# Patient Record
Sex: MALE | Race: BLACK OR AFRICAN AMERICAN | Employment: PART TIME | URBAN - METROPOLITAN AREA
[De-identification: names, ages, dates, MRNs, and addresses within clinical notes are randomized per-mention and may not be internally consistent; named-entity substitution may affect disease eponyms.]

---

## 2017-01-17 RX ORDER — LISINOPRIL 20 MG/1
TABLET ORAL
Qty: 90 TAB | Refills: 1 | Status: SHIPPED | OUTPATIENT
Start: 2017-01-17 | End: 2017-08-23 | Stop reason: SDUPTHER

## 2017-01-17 RX ORDER — WARFARIN SODIUM 5 MG/1
TABLET ORAL
Qty: 30 TAB | Refills: 5 | Status: SHIPPED | OUTPATIENT
Start: 2017-01-17 | End: 2017-07-26 | Stop reason: SDUPTHER

## 2017-05-12 RX ORDER — WARFARIN 1 MG/1
TABLET ORAL
Qty: 60 TAB | Refills: 0 | Status: SHIPPED | OUTPATIENT
Start: 2017-05-12 | End: 2017-07-26 | Stop reason: SDUPTHER

## 2017-05-24 RX ORDER — HYDROCHLOROTHIAZIDE 12.5 MG/1
TABLET ORAL
Qty: 90 TAB | Refills: 1 | Status: SHIPPED | OUTPATIENT
Start: 2017-05-24 | End: 2017-09-22 | Stop reason: SDUPTHER

## 2017-07-26 RX ORDER — WARFARIN 1 MG/1
TABLET ORAL
Qty: 60 TAB | Refills: 5 | Status: SHIPPED | OUTPATIENT
Start: 2017-07-26 | End: 2018-03-21 | Stop reason: SDUPTHER

## 2017-07-26 RX ORDER — WARFARIN SODIUM 5 MG/1
TABLET ORAL
Qty: 30 TAB | Refills: 5 | Status: SHIPPED | OUTPATIENT
Start: 2017-07-26 | End: 2018-03-21 | Stop reason: SDUPTHER

## 2017-08-15 RX ORDER — SILDENAFIL CITRATE 100 MG/1
TABLET, FILM COATED ORAL
Qty: 30 TAB | Refills: 5 | Status: SHIPPED | OUTPATIENT
Start: 2017-08-15 | End: 2017-10-19 | Stop reason: SDUPTHER

## 2017-08-23 RX ORDER — LISINOPRIL 20 MG/1
TABLET ORAL
Qty: 90 TAB | Refills: 1 | Status: SHIPPED | OUTPATIENT
Start: 2017-08-23 | End: 2018-03-21 | Stop reason: SDUPTHER

## 2017-09-22 RX ORDER — HYDROCHLOROTHIAZIDE 12.5 MG/1
TABLET ORAL
Qty: 90 TAB | Refills: 1 | Status: SHIPPED | OUTPATIENT
Start: 2017-09-22 | End: 2018-03-21 | Stop reason: SDUPTHER

## 2017-10-19 NOTE — TELEPHONE ENCOUNTER
Please see rx for patient to fax to [address] [place] [address] for [address]. Patient's secondary insurance wont cover this medication. Requested Prescriptions     Pending Prescriptions Disp Refills    sildenafil citrate (VIAGRA) 100 mg tablet 30 Tab 5     Sig: TAKE 1 TABLET BY MOUTH 30 MINUTES PRIOR TO SEXUAL ACTIVITY AS NEEDED.  (DO NOT EXCEED 100MG DAILY)

## 2017-10-24 RX ORDER — SILDENAFIL 100 MG/1
TABLET, FILM COATED ORAL
Qty: 30 TAB | Refills: 5 | Status: SHIPPED | OUTPATIENT
Start: 2017-10-24

## 2018-03-21 RX ORDER — WARFARIN SODIUM 5 MG/1
TABLET ORAL
Qty: 30 TAB | Refills: 5 | Status: SHIPPED | OUTPATIENT
Start: 2018-03-21 | End: 2018-08-28 | Stop reason: SDUPTHER

## 2018-03-21 RX ORDER — WARFARIN 1 MG/1
TABLET ORAL
Qty: 60 TAB | Refills: 5 | Status: SHIPPED | OUTPATIENT
Start: 2018-03-21 | End: 2018-08-28 | Stop reason: SDUPTHER

## 2018-03-21 RX ORDER — HYDROCHLOROTHIAZIDE 12.5 MG/1
TABLET ORAL
Qty: 90 TAB | Refills: 1 | Status: SHIPPED | OUTPATIENT
Start: 2018-03-21 | End: 2018-06-25 | Stop reason: SDUPTHER

## 2018-03-21 RX ORDER — LISINOPRIL 20 MG/1
TABLET ORAL
Qty: 90 TAB | Refills: 1 | Status: SHIPPED | OUTPATIENT
Start: 2018-03-21 | End: 2018-06-25 | Stop reason: SDUPTHER

## 2018-04-12 ENCOUNTER — TELEPHONE (OUTPATIENT)
Dept: FAMILY MEDICINE CLINIC | Age: 63
End: 2018-04-12

## 2018-06-27 RX ORDER — LISINOPRIL 20 MG/1
TABLET ORAL
Qty: 90 TAB | Refills: 1 | Status: SHIPPED | OUTPATIENT
Start: 2018-06-27 | End: 2019-06-07

## 2018-06-27 RX ORDER — HYDROCHLOROTHIAZIDE 12.5 MG/1
TABLET ORAL
Qty: 90 TAB | Refills: 1 | Status: SHIPPED | OUTPATIENT
Start: 2018-06-27

## 2018-08-24 RX ORDER — SILDENAFIL 100 MG/1
TABLET, FILM COATED ORAL
Qty: 30 TAB | Refills: 5 | Status: SHIPPED | OUTPATIENT
Start: 2018-08-24

## 2018-08-28 RX ORDER — WARFARIN SODIUM 5 MG/1
TABLET ORAL
Qty: 30 TAB | Refills: 5 | Status: SHIPPED | OUTPATIENT
Start: 2018-08-28 | End: 2019-05-24 | Stop reason: SDUPTHER

## 2018-08-28 RX ORDER — WARFARIN 1 MG/1
TABLET ORAL
Qty: 60 TAB | Refills: 5 | Status: SHIPPED | OUTPATIENT
Start: 2018-08-28 | End: 2019-05-24 | Stop reason: SDUPTHER

## 2018-09-15 RX ORDER — SILDENAFIL 100 MG/1
TABLET, FILM COATED ORAL
Qty: 30 TAB | Refills: 5 | Status: SHIPPED | OUTPATIENT
Start: 2018-09-15

## 2019-05-24 ENCOUNTER — OFFICE VISIT (OUTPATIENT)
Dept: FAMILY MEDICINE CLINIC | Age: 64
End: 2019-05-24

## 2019-05-24 VITALS
RESPIRATION RATE: 16 BRPM | SYSTOLIC BLOOD PRESSURE: 144 MMHG | HEART RATE: 51 BPM | WEIGHT: 225 LBS | OXYGEN SATURATION: 98 % | DIASTOLIC BLOOD PRESSURE: 88 MMHG | HEIGHT: 72 IN | TEMPERATURE: 97.4 F | BODY MASS INDEX: 30.48 KG/M2

## 2019-05-24 DIAGNOSIS — I26.99 OTHER PULMONARY EMBOLISM WITHOUT ACUTE COR PULMONALE, UNSPECIFIED CHRONICITY (HCC): ICD-10-CM

## 2019-05-24 DIAGNOSIS — Z79.01 ANTICOAGULANT LONG-TERM USE: Primary | ICD-10-CM

## 2019-05-24 LAB
INR BLD: 1.4
PT POC: 16.8 SECONDS
VALID INTERNAL CONTROL?: YES

## 2019-05-24 RX ORDER — WARFARIN 1 MG/1
TABLET ORAL
Qty: 90 TAB | Refills: 0 | Status: SHIPPED | OUTPATIENT
Start: 2019-05-24 | End: 2022-07-16 | Stop reason: SDUPTHER

## 2019-05-24 RX ORDER — WARFARIN SODIUM 5 MG/1
TABLET ORAL
Qty: 30 TAB | Refills: 0 | Status: SHIPPED | OUTPATIENT
Start: 2019-05-24 | End: 2019-06-14 | Stop reason: SDUPTHER

## 2019-05-24 NOTE — PROGRESS NOTES
1. Have you been to the ER, urgent care clinic since your last visit? Hospitalized since your last visit? Yes When: 4-2019 [facility]    2. Have you seen or consulted any other health care providers outside of the [address] [place] [address] since your last visit? Include any pap smears or colon screening.  No     Chief Complaint   Patient presents with    Anticoagulation       Visit Vitals  /88 (BP 1 Location: Right arm, BP Patient Position: At rest)   Pulse (!) 51   Temp 97.4 °F (36.3 °C)   Resp 16   Ht 6' (1.829 m)   Wt 225 lb (102.1 kg)   SpO2 98%   BMI 30.52 kg/m²

## 2019-05-24 NOTE — PROGRESS NOTES
E[facility]               [address]               [phone]      Shayne Ghotra is a 66 y.o. male and presents with Anticoagulation ( pat)         Subjective:    States had blood clot in lung  Was placed on coumadinl  Last year was being seen by [place]  Has not had INR checked since last year    ROS:     ROS    The problem list was updated as a part of today's visit. Patient Active Problem List   Diagnosis Code    Depression F32.9    Pulmonary embolus ([address] I26.99    Gastric ulcer K25.9    Acute low back pain due to trauma M54.5, G89.11    Encounter for monitoring Coumadin therapy Z51.81, Z79.01    HTN (hypertension) I10       The PSH, FH were reviewed. SH:  Social History     Tobacco Use    Smoking status: Never Smoker    Smokeless tobacco: Never Used   Substance Use Topics    Alcohol use: No    Drug use: No       Medications/Allergies:  Current Outpatient Medications on File Prior to Visit   Medication Sig Dispense Refill    sildenafil citrate (VIAGRA) 100 mg tablet take 1 tablet by mouth 30 MINUTES PRIOR TO SEXUAL ACTIVITY IF NEEDED ( DO NOT EXCEED 100MG DAILY) 30 Tab 5    warfarin (COUMADIN) 5 mg tablet TAKE 2 (1 MG) TABLETS BY MOUTH DAILY WITH 1 (5 MG) TABLET ONCE DAILY TO TOTAL 7 MG DAILY 30 Tab 5    warfarin (COUMADIN) 1 mg tablet TAKE 2 (1 MG) TABLETS BY MOUTH ONCE DAILY WITH 1 (5 MG) TABLET ONCE DAILY TO TOTAL 7 MG DAILY 60 Tab 5    sildenafil citrate (VIAGRA) 100 mg tablet take 1 tablet by mouth 30 MINUTES PRIOR TO SEXUAL ACTIVITY IF NEEDED ( DO NOT EXCEED 100MG DAILY) 30 Tab 5    lisinopril (PRINIVIL, ZESTRIL) 20 mg tablet take 1 tablet by mouth once daily 90 Tab 1    hydroCHLOROthiazide (HYDRODIURIL) 12.5 mg tablet take 1 tablet by mouth once daily 90 Tab 1    sildenafil citrate (VIAGRA) 100 mg tablet TAKE 1 TABLET BY MOUTH 30 MINUTES PRIOR TO SEXUAL ACTIVITY AS NEEDED.  (DO NOT EXCEED 100MG DAILY) 30 Tab 5    citalopram (CELEXA) 40 mg tablet Take 40 mg by mouth See Admin Instructions. Take one-half tablet by mouth at night.  pantoprazole (PROTONIX) 40 mg tablet Take 40 mg by mouth daily.  guaiFENesin-codeine (ROBITUSSIN AC) 100-10 mg/5 mL solution Take 5 mL by mouth three (3) times daily as needed for Cough. Max Daily Amount: 15 mL. 90 mL 0    lisinopril (PRINIVIL, ZESTRIL) 40 mg tablet Take 1 Tab by mouth daily. 90 Tab 1    prazosin (MINIPRESS) 2 mg capsule Take 2 mg by mouth nightly.  traZODone (DESYREL) 50 mg tablet Take 50 mg by mouth See Admin Instructions. Take one tablet by mouth nightly, as needed for insomnia.  atorvastatin (LIPITOR) 40 mg tablet Take 40 mg by mouth. Take one-half tablet by mouth daily. No current facility-administered medications on file prior to visit. No Known Allergies    Objective:  Visit Vitals  /88 (BP 1 Location: Right arm, BP Patient Position: At rest)   Pulse (!) 51   Temp 97.4 °F (36.3 °C)   Resp 16   Ht 6' (1.829 m)   Wt 225 lb (102.1 kg)   SpO2 98%   BMI 30.52 kg/m²    Body mass index is 30.52 kg/m².     Physical assessment  Physical Exam      Labwork and Ancillary Studies:    CBC w/Diff  Lab Results   Component Value Date/Time    WBC 7.6 09/22/2015 02:02 AM    HGB 9.4 (L) 09/22/2015 02:02 AM    PLATELET 232 84/56/2931 02:02 AM         Basic Metabolic Profile  Lab Results   Component Value Date/Time    Sodium 138 11/30/2016 03:40 PM    Potassium 4.2 11/30/2016 03:40 PM    Chloride 103 11/30/2016 03:40 PM    CO2 27 11/30/2016 03:40 PM    Anion gap 8 11/30/2016 03:40 PM    Glucose 102 (H) 11/30/2016 03:40 PM    BUN 13 11/30/2016 03:40 PM    Creatinine 1.44 (H) 11/30/2016 03:40 PM    BUN/Creatinine ratio 9 (L) 11/30/2016 03:40 PM    GFR est AA >60 11/30/2016 03:40 PM    GFR est non-AA 50 (L) 11/30/2016 03:40 PM    Calcium 9.1 11/30/2016 03:40 PM        Cholesterol  Lab Results   Component Value Date/Time    Cholesterol, total 257 (H) 11/30/2016 03:40 PM    HDL Cholesterol 42 11/30/2016 03:40 PM    LDL, calculated 184.8 (H) 11/30/2016 03:40 PM    Triglyceride 151 (H) 11/30/2016 03:40 PM    CHOL/HDL Ratio 6.1 (H) 11/30/2016 03:40 PM       Health Maintenance:   Health Maintenance   Topic Date Due    Hepatitis C Screening  1955    DTaP/Tdap/Td series (1 - Tdap) 07/02/1978    Shingrix Vaccine Age 50> (1 of 2) 07/02/2007    FOBT Q 1 YEAR AGE 50-75  07/02/2007    Influenza Age 5 to Adult  08/01/2019    Pneumococcal 0-64 years  Aged Out       Assessment/Plan:    Diagnoses and all orders for this visit:    1. Anticoagulant long-term use  -     AMB POC PT/INR            I have discussed the diagnosis with the patient and the intended plan as seen in the above orders. The patient has received an After-Visit Summary and questions were answered concerning future plans. An After Visit Summary was printed and given to the patient. All diagnosis have been discussed with the patient and all of the patient's questions have been answered. Greater than 50% of the time was spent in counseling and coordination of care. Sylwia German, [facility]  [address] [facility]   [address] [facility] [address]  [id number]

## 2019-05-24 NOTE — PROGRESS NOTES
Patient stated he was being seen at [facility] ER to maintain his coumadin. Patient also stated he is currently out of coumdain 5mg and coumadin 1mg.

## 2019-05-31 ENCOUNTER — CLINICAL SUPPORT (OUTPATIENT)
Dept: FAMILY MEDICINE CLINIC | Age: 64
End: 2019-05-31

## 2019-05-31 VITALS
SYSTOLIC BLOOD PRESSURE: 148 MMHG | DIASTOLIC BLOOD PRESSURE: 87 MMHG | OXYGEN SATURATION: 98 % | HEART RATE: 54 BPM | WEIGHT: 225 LBS | RESPIRATION RATE: 16 BRPM | BODY MASS INDEX: 30.48 KG/M2 | HEIGHT: 72 IN | TEMPERATURE: 97.5 F

## 2019-05-31 DIAGNOSIS — Z79.01 ANTICOAGULANT LONG-TERM USE: Primary | ICD-10-CM

## 2019-05-31 LAB
INR BLD: 2.5
PT POC: 29.4 SECONDS
VALID INTERNAL CONTROL?: YES

## 2019-05-31 NOTE — PROGRESS NOTES
1. Have you been to the ER, urgent care clinic since your last visit? Hospitalized since your last visit? No    2. Have you seen or consulted any other health care providers outside of the [address] [place] [address] since your last visit? Include any pap smears or colon screening. Nohemy Houser is a 66 y.o. male who presents today for Anticoagulation monitoring. Indication: DVT  INR Goal: 2.0-3.0. Current dose:  Coumadin 8mg daily. Missed Coumadin Doses:  None  Medication Changes:  no  Dietary Changes:  no    Symptoms: taking coumadin appropriately without any bleeding. Latest INRs:  Lab Results   Component Value Date/Time    INR 2.3 (H) 05/01/2017 02:49 PM    INR 1.8 (H) 07/21/2016 12:38 PM    INR 1.5 (H) 09/22/2015 02:02 AM    INR POC 2.5 05/31/2019 10:20 AM    INR POC 1.4 05/24/2019 01:50 PM    INR POC 2.1 12/23/2016 05:37 PM    Prothrombin time 24.3 (H) 05/01/2017 02:49 PM    Prothrombin time 20.6 (H) 07/21/2016 12:38 PM    Prothrombin time 16.9 (H) 09/22/2015 02:02 AM        New Coumadin dose:.current treatment plan is effective, no change in therapy. Contiune with coumadin 8mg    Next check to be scheduled for  1 weeks.

## 2019-06-07 ENCOUNTER — CLINICAL SUPPORT (OUTPATIENT)
Dept: FAMILY MEDICINE CLINIC | Age: 64
End: 2019-06-07

## 2019-06-07 ENCOUNTER — TELEPHONE (OUTPATIENT)
Dept: FAMILY MEDICINE CLINIC | Age: 64
End: 2019-06-07

## 2019-06-07 VITALS — HEART RATE: 52 BPM | TEMPERATURE: 97.5 F | DIASTOLIC BLOOD PRESSURE: 85 MMHG | SYSTOLIC BLOOD PRESSURE: 127 MMHG

## 2019-06-07 DIAGNOSIS — Z79.01 ANTICOAGULANT LONG-TERM USE: Primary | ICD-10-CM

## 2019-06-07 DIAGNOSIS — I10 ESSENTIAL HYPERTENSION: ICD-10-CM

## 2019-06-07 LAB
INR BLD: 3.7
PT POC: 44 SECONDS
VALID INTERNAL CONTROL?: YES

## 2019-06-07 RX ORDER — LISINOPRIL 40 MG/1
40 TABLET ORAL DAILY
Qty: 90 TAB | Refills: 1 | Status: SHIPPED | OUTPATIENT
Start: 2019-06-07

## 2019-06-07 NOTE — PROGRESS NOTES
1. Have you been to the ER, urgent care clinic since your last visit? Hospitalized since your last visit? No    2. Have you seen or consulted any other health care providers outside of the [address] [place] [address] since your last visit? Include any pap smears or colon screening. No      Chief Complaint   Patient presents with    Coagulation disorder     Patient reported of increase his Lisinopril from 20mg to 40mg two days ago without discussing with the Provider. Provider made aware verbally. Visit Vitals  /85   Pulse (!) 52   Temp 97.5 °F (36.4 °C) (Oral)     Dona Dukes is a 66 y.o. male who presents today for Anticoagulation monitoring. Indication: PE  INR Goal: 2.0-3.0. Current dose:  Coumadin 8mg daily. Missed Coumadin Doses:  None  Medication Changes:  no  Dietary Changes:  no    Symptoms: taking coumadin appropriately without any bleeding. Latest INRs:  Lab Results   Component Value Date/Time    INR 2.3 (H) 05/01/2017 02:49 PM    INR 1.8 (H) 07/21/2016 12:38 PM    INR 1.5 (H) 09/22/2015 02:02 AM    INR POC 3.7 06/07/2019 09:20 AM    INR POC 2.5 05/31/2019 10:20 AM    INR POC 1.4 05/24/2019 01:50 PM    Prothrombin time 24.3 (H) 05/01/2017 02:49 PM    Prothrombin time 20.6 (H) 07/21/2016 12:38 PM    Prothrombin time 16.9 (H) 09/22/2015 02:02 AM        New Coumadin dose:. Coumadin 7mg . Next check to be scheduled for  1 weeks.

## 2019-06-12 ENCOUNTER — HOSPITAL ENCOUNTER (OUTPATIENT)
Dept: LAB | Age: 64
Discharge: HOME | End: 2019-06-12
Payer: OTHER GOVERNMENT

## 2019-06-12 ENCOUNTER — CLINICAL SUPPORT (OUTPATIENT)
Dept: FAMILY MEDICINE CLINIC | Age: 64
End: 2019-06-12

## 2019-06-12 DIAGNOSIS — I26.99 OTHER PULMONARY EMBOLISM WITHOUT ACUTE COR PULMONALE, UNSPECIFIED CHRONICITY (HCC): ICD-10-CM

## 2019-06-12 DIAGNOSIS — I26.99 OTHER PULMONARY EMBOLISM WITHOUT ACUTE COR PULMONALE, UNSPECIFIED CHRONICITY (HCC): Primary | ICD-10-CM

## 2019-06-12 LAB
INR PPP: 2.6 (ref 0.8–1.2)
PROTHROMBIN TIME: 27.8 SEC (ref 11.5–15.2)

## 2019-06-12 PROCEDURE — 85610 PROTHROMBIN TIME: CPT

## 2019-06-12 NOTE — PROGRESS NOTES
Patient presents to office for PT/INR check. Out of INR strips to order POC INR. TANI Stoner ordered INR to be done by lab draw. The office will call patient with results and any medication adjustments when INR results are received for lab.

## 2019-06-14 DIAGNOSIS — I26.99 OTHER PULMONARY EMBOLISM WITHOUT ACUTE COR PULMONALE, UNSPECIFIED CHRONICITY (HCC): ICD-10-CM

## 2019-06-14 DIAGNOSIS — Z79.01 ANTICOAGULANT LONG-TERM USE: ICD-10-CM

## 2019-06-14 RX ORDER — WARFARIN SODIUM 5 MG/1
TABLET ORAL
Qty: 30 TAB | Refills: 0 | Status: SHIPPED | OUTPATIENT
Start: 2019-06-14 | End: 2022-07-16 | Stop reason: SDUPTHER

## 2019-06-27 ENCOUNTER — CLINICAL SUPPORT (OUTPATIENT)
Dept: FAMILY MEDICINE CLINIC | Age: 64
End: 2019-06-27

## 2019-06-27 DIAGNOSIS — Z79.01 ANTICOAGULANT LONG-TERM USE: Primary | ICD-10-CM

## 2019-06-27 LAB
INR BLD: 2.9
PT POC: 35.4 SECONDS
VALID INTERNAL CONTROL?: YES

## 2019-06-27 NOTE — PROGRESS NOTES
1. Have you been to the ER, urgent care clinic since your last visit? Hospitalized since your last visit? No    2. Have you seen or consulted any other health care providers outside of the [address] [place] [address] since your last visit? Include any pap smears or colon screening. No     Chief Complaint   Patient presents with    Anticoagulation     There were no vitals taken for this visit. Jake Ramos is a 66 y.o. male who presents today for Anticoagulation monitoring. Indication: DVT  INR Goal: 2.0-3.0. Current dose:  Coumadin 7mg daily. Missed Coumadin Doses:  None  Medication Changes:  no  Dietary Changes:  no    Symptoms: taking coumadin appropriately without any bleeding. Latest INRs:  Lab Results   Component Value Date/Time    INR 2.6 (H) 06/12/2019 09:13 AM    INR 2.3 (H) 05/01/2017 02:49 PM    INR 1.8 (H) 07/21/2016 12:38 PM    INR POC 2.9 06/27/2019 02:45 PM    INR POC 3.7 06/07/2019 09:20 AM    INR POC 2.5 05/31/2019 10:20 AM    Prothrombin time 27.8 (H) 06/12/2019 09:13 AM    Prothrombin time 24.3 (H) 05/01/2017 02:49 PM    Prothrombin time 20.6 (H) 07/21/2016 12:38 PM        New Coumadin dose:.current treatment plan is effective, no change in therapy. Coumadin 7mg     Next check to be scheduled for  1 weeks.

## 2019-07-05 ENCOUNTER — CLINICAL SUPPORT (OUTPATIENT)
Dept: FAMILY MEDICINE CLINIC | Age: 64
End: 2019-07-05

## 2019-07-05 VITALS
HEART RATE: 55 BPM | RESPIRATION RATE: 16 BRPM | OXYGEN SATURATION: 96 % | WEIGHT: 231 LBS | TEMPERATURE: 97.6 F | SYSTOLIC BLOOD PRESSURE: 142 MMHG | DIASTOLIC BLOOD PRESSURE: 81 MMHG | HEIGHT: 72 IN | BODY MASS INDEX: 31.29 KG/M2

## 2019-07-05 DIAGNOSIS — Z79.01 ENCOUNTER FOR MONITORING COUMADIN THERAPY: ICD-10-CM

## 2019-07-05 DIAGNOSIS — I26.99 OTHER PULMONARY EMBOLISM WITHOUT ACUTE COR PULMONALE, UNSPECIFIED CHRONICITY (HCC): Primary | ICD-10-CM

## 2019-07-05 DIAGNOSIS — Z51.81 ENCOUNTER FOR MONITORING COUMADIN THERAPY: ICD-10-CM

## 2019-07-05 LAB
INR BLD: 2.4
PT POC: 28.8 SECONDS
VALID INTERNAL CONTROL?: YES

## 2019-07-05 NOTE — PROGRESS NOTES
Ryanne Ruiz is a 60 y.o. male who presents today for Anticoagulation monitoring. Indication: PE  INR Goal: 2.0-3.0. Current dose:  Coumadin 7 mg daily. Missed Coumadin Doses:  None  Medication Changes:  no  Dietary Changes:  no    Symptoms: taking coumadin appropriately without any bleeding. Latest INRs:  Lab Results   Component Value Date/Time    INR 2.6 (H) 06/12/2019 09:13 AM    INR 2.3 (H) 05/01/2017 02:49 PM    INR 1.8 (H) 07/21/2016 12:38 PM    INR POC 2.9 06/27/2019 02:45 PM    INR POC 3.7 06/07/2019 09:20 AM    INR POC 2.5 05/31/2019 10:20 AM    Prothrombin time 27.8 (H) 06/12/2019 09:13 AM    Prothrombin time 24.3 (H) 05/01/2017 02:49 PM    Prothrombin time 20.6 (H) 07/21/2016 12:38 PM        New Coumadin dose:.current treatment plan is effective, no change in therapy. Next check to be scheduled for  2 weeks.

## 2019-07-08 ENCOUNTER — TELEPHONE (OUTPATIENT)
Dept: FAMILY MEDICINE CLINIC | Age: 64
End: 2019-07-08

## 2019-07-08 NOTE — TELEPHONE ENCOUNTER
Called mr may to inform him his insurance covers xarelto, the cost is 28.00 a month, asked him if he would like to proceed with changing warfarin to [employer] No answer, violet

## 2019-07-09 NOTE — TELEPHONE ENCOUNTER
Spoke with mr may, he is in agreement with starting xarelto, called pharmacy and informed them Educated mr may he still needs to be careful with any bleeding but he no longer has to monitor his food choices All diagnosis have been discussed with the patient and all of the patient's questions have been answered.

## 2019-07-23 ENCOUNTER — TELEPHONE (OUTPATIENT)
Dept: FAMILY MEDICINE CLINIC | Age: 64
End: 2019-07-23

## 2019-07-23 NOTE — TELEPHONE ENCOUNTER
Attempted to contact patient in regards to knowing whether patient is taking xarelto or is continuing to take coumadin. If patient is taking xarelto he does not have to get his PT/INR checked. Unable to leave voicemail.

## 2019-07-23 NOTE — TELEPHONE ENCOUNTER
Attempted to contact patient's wife in regards to whether or not patient is taking xarelto or coumadin. Left voicemail to return call.

## 2023-06-07 NOTE — TELEPHONE ENCOUNTER
Called patient per PATRICIA cabrera on coumdain 7mg and recheck pt/inr in one week. Patiient made aware and schedule his appointment with .
Bed/Stretcher in lowest position, wheels locked, appropriate side rails in place/Call bell, personal items and telephone in reach/Instruct patient to call for assistance before getting out of bed/chair/stretcher/Non-slip footwear applied when patient is off stretcher/[place] to call system/Physically safe environment - no spills, clutter or unnecessary equipment/Purposeful proactive rounding/Room/bathroom lighting operational, light cord in reach